# Patient Record
Sex: MALE | Race: WHITE | Employment: OTHER | URBAN - METROPOLITAN AREA
[De-identification: names, ages, dates, MRNs, and addresses within clinical notes are randomized per-mention and may not be internally consistent; named-entity substitution may affect disease eponyms.]

---

## 2017-04-04 ENCOUNTER — OFFICE VISIT (OUTPATIENT)
Dept: ENT CLINIC | Age: 77
End: 2017-04-04

## 2017-04-04 VITALS — TEMPERATURE: 98.1 F | HEIGHT: 72 IN | SYSTOLIC BLOOD PRESSURE: 132 MMHG | DIASTOLIC BLOOD PRESSURE: 66 MMHG

## 2017-04-04 DIAGNOSIS — H81.12 BPPV (BENIGN PAROXYSMAL POSITIONAL VERTIGO), LEFT: Primary | ICD-10-CM

## 2017-04-04 DIAGNOSIS — H81.319 VERTIGO, AURAL, UNSPECIFIED LATERALITY: ICD-10-CM

## 2017-04-04 DIAGNOSIS — H61.23 BILATERAL IMPACTED CERUMEN: ICD-10-CM

## 2017-04-04 PROCEDURE — 99203 OFFICE O/P NEW LOW 30 MIN: CPT | Performed by: OTOLARYNGOLOGY

## 2017-04-04 PROCEDURE — 69210 REMOVE IMPACTED EAR WAX UNI: CPT | Performed by: OTOLARYNGOLOGY

## 2017-10-26 ENCOUNTER — OFFICE VISIT (OUTPATIENT)
Dept: DERMATOLOGY | Age: 77
End: 2017-10-26

## 2017-10-26 DIAGNOSIS — C84.A0 CUTANEOUS T-CELL LYMPHOMA, UNSPECIFIED BODY REGION (HCC): Primary | ICD-10-CM

## 2017-10-26 DIAGNOSIS — L57.0 AK (ACTINIC KERATOSIS): ICD-10-CM

## 2017-10-26 PROCEDURE — 17000 DESTRUCT PREMALG LESION: CPT | Performed by: DERMATOLOGY

## 2017-10-26 PROCEDURE — G8427 DOCREV CUR MEDS BY ELIG CLIN: HCPCS | Performed by: DERMATOLOGY

## 2017-10-26 PROCEDURE — G8421 BMI NOT CALCULATED: HCPCS | Performed by: DERMATOLOGY

## 2017-10-26 PROCEDURE — 1123F ACP DISCUSS/DSCN MKR DOCD: CPT | Performed by: DERMATOLOGY

## 2017-10-26 PROCEDURE — 4040F PNEUMOC VAC/ADMIN/RCVD: CPT | Performed by: DERMATOLOGY

## 2017-10-26 PROCEDURE — 17003 DESTRUCT PREMALG LES 2-14: CPT | Performed by: DERMATOLOGY

## 2017-10-26 PROCEDURE — 1036F TOBACCO NON-USER: CPT | Performed by: DERMATOLOGY

## 2017-10-26 PROCEDURE — G8484 FLU IMMUNIZE NO ADMIN: HCPCS | Performed by: DERMATOLOGY

## 2017-10-26 PROCEDURE — 99213 OFFICE O/P EST LOW 20 MIN: CPT | Performed by: DERMATOLOGY

## 2017-10-26 RX ORDER — TRIAMCINOLONE ACETONIDE 1 MG/G
CREAM TOPICAL
Qty: 160 G | Refills: 3 | Status: SHIPPED | OUTPATIENT
Start: 2017-10-26 | End: 2019-10-29 | Stop reason: SDUPTHER

## 2017-10-26 NOTE — PROGRESS NOTES
[facility]  Aylin Vines, Liseth Schuler  1940    65 y.o. male     Date of Visit: 10/26/2017    Chief Complaint: f/u for AKs and CTCL    History of Present Illness:    1. 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 He denies any associated pruritus. Patches improve with use of triamcinolone 0.1% cream.    2.  Follow-up for history of actinic keratosescomplains of several new scaly lesions on the face today. Review of Systems:  Gen: No fevers chills or night sweats. Past Medical History, Family History, Surgical History, Medications and Allergies reviewed. Past Medical History:   Diagnosis Date    Diabetes mellitus type 2, uncomplicated ([address]     Epidermoid cyst of neck     left    H/O carotid artery stenosis 2010    left    Hyperlipidemia     Hypertension     [address]osis fungoides [address] [address]      Past Surgical History:   Procedure Laterality Date    CAROTID ENDARTERECTOMY Left 2010    Dr. Dee Dee Neff - w/Hemashield patch angioplasty    CORONARY ARTERY BYPASS GRAFT      CYST REMOVAL  2013    Dr. Jourdan Mendiola - epidermoid cyst L neck at base   [address] [facility]         No Known Allergies  Outpatient Prescriptions Marked as Taking for the 10/26/17 encounter (Office Visit) with Milagro Donato MD   Medication Sig Dispense Refill    triamcinolone (KENALOG) 0.1 % cream Apply to affected areas twice daily until improved. 160 g 3    atorvastatin (LIPITOR) 80 MG tablet Take 1 tablet by mouth nightly      metoprolol (TOPROL XL) 50 MG XL tablet   Take 50 mg by mouth daily       aspirin 325 MG tablet Take 325 mg by mouth daily.       chlorthalidone (HYGROTEN) 25 MG tablet   Take 25 mg by mouth daily       lisinopril (PRINIVIL;ZESTRIL) 20 MG tablet   Take 20 mg by mouth daily       metformin (GLUCOPHAGE) 1000 MG tablet   1,000 mg 2 times daily (with meals)       ezetimibe (ZETIA) 10 MG

## 2017-11-29 ENCOUNTER — HOSPITAL ENCOUNTER (OUTPATIENT)
Dept: VASCULAR LAB | Age: 77
Discharge: HOME | End: 2017-11-29
Attending: INTERNAL MEDICINE | Admitting: INTERNAL MEDICINE

## 2017-11-29 DIAGNOSIS — I65.23 OCCLUSION AND STENOSIS OF BILATERAL CAROTID ARTERIES: ICD-10-CM

## 2017-12-20 ENCOUNTER — OFFICE VISIT (OUTPATIENT)
Dept: INTERNAL MEDICINE | Age: 77
End: 2017-12-20

## 2017-12-20 ENCOUNTER — HOSPITAL ENCOUNTER (OUTPATIENT)
Dept: OTHER | Age: 77
Discharge: HOME | End: 2017-12-20
Attending: INTERNAL MEDICINE | Admitting: INTERNAL MEDICINE

## 2017-12-20 VITALS
HEIGHT: 72 IN | DIASTOLIC BLOOD PRESSURE: 78 MMHG | WEIGHT: 207 LBS | HEART RATE: 66 BPM | BODY MASS INDEX: 28.04 KG/M2 | RESPIRATION RATE: 12 BRPM | SYSTOLIC BLOOD PRESSURE: 136 MMHG

## 2017-12-20 DIAGNOSIS — M79.641 HAND PAIN, RIGHT: Primary | ICD-10-CM

## 2017-12-20 DIAGNOSIS — M79.641 HAND PAIN, RIGHT: ICD-10-CM

## 2017-12-20 PROCEDURE — G8598 ASA/ANTIPLAT THER USED: HCPCS | Performed by: INTERNAL MEDICINE

## 2017-12-20 PROCEDURE — 1123F ACP DISCUSS/DSCN MKR DOCD: CPT | Performed by: INTERNAL MEDICINE

## 2017-12-20 PROCEDURE — G8427 DOCREV CUR MEDS BY ELIG CLIN: HCPCS | Performed by: INTERNAL MEDICINE

## 2017-12-20 PROCEDURE — 1036F TOBACCO NON-USER: CPT | Performed by: INTERNAL MEDICINE

## 2017-12-20 PROCEDURE — 4040F PNEUMOC VAC/ADMIN/RCVD: CPT | Performed by: INTERNAL MEDICINE

## 2017-12-20 PROCEDURE — G8419 CALC BMI OUT NRM PARAM NOF/U: HCPCS | Performed by: INTERNAL MEDICINE

## 2017-12-20 PROCEDURE — 99213 OFFICE O/P EST LOW 20 MIN: CPT | Performed by: INTERNAL MEDICINE

## 2017-12-20 PROCEDURE — G8484 FLU IMMUNIZE NO ADMIN: HCPCS | Performed by: INTERNAL MEDICINE

## 2017-12-20 RX ORDER — METHYLPREDNISOLONE 4 MG/1
4 TABLET ORAL SEE ADMIN INSTRUCTIONS
Qty: 1 KIT | Refills: 0 | Status: SHIPPED | OUTPATIENT
Start: 2017-12-20 | End: 2017-12-26

## 2017-12-20 NOTE — PROGRESS NOTES
Chief Complaint   Patient presents with    Hand Pain     right        HPI:  A patient of Dr. Checo Pelayo. A one week history of pain in his right palm with swelling of the dorsum and knuckles. No history of trauma or injury. When he does certain movements he gets stabbing shooting pains in the second and third digits that lasts a second or two. Social History     Social History    Marital status: Single     Spouse name: N/A    Number of children: N/A    Years of education: N/A     Occupational History    Not on file. Social History Main Topics    Smoking status: Never Smoker    Smokeless tobacco: Never Used      Comment: Not needed    Alcohol use 1.8 oz/week     3 Standard drinks or equivalent per week      Comment: [place]    Drug use: No    Sexual activity: Not on file     Other Topics Concern    Not on file     Social History Narrative    No narrative on file     Patient Active Problem List   Diagnosis    H/O carotid artery stenosis     Past Medical History:   Diagnosis Date    Diabetes mellitus type 2, uncomplicated ([facility] [address]     Epidermoid cyst of neck 2013    left    H/O carotid artery stenosis 2010    left    Hyperlipidemia     Hypertension     Mycosis fungoides ([facility] [address]      Past Surgical History:   Procedure Laterality Date    CAROTID ENDARTERECTOMY Left 6/2/2010    Dr. Anna Marie Tong - w/Hemashield patch angioplasty    CORONARY ARTERY BYPASS GRAFT      CYST REMOVAL  5/31/2013    Dr. Lisette Santoyo - epidermoid cyst L neck at base    HERNIA REPAIR       Current Outpatient Prescriptions   Medication Sig Dispense Refill    triamcinolone (KENALOG) 0.1 % cream Apply to affected areas twice daily until improved. 160 g 3    atorvastatin (LIPITOR) 80 MG tablet Take 1 tablet by mouth nightly      metoprolol (TOPROL XL) 50 MG XL tablet   Take 50 mg by mouth daily       aspirin 325 MG tablet Take 325 mg by mouth daily.       chlorthalidone (HYGROTEN) 25 MG tablet   Take 25 mg by mouth daily       lisinopril (PRINIVIL;ZESTRIL) 20 MG tablet   Take 20 mg by mouth daily       metformin (GLUCOPHAGE) 1000 MG tablet   1,000 mg 2 times daily (with meals)       ezetimibe (ZETIA) 10 MG tablet Take 10 mg by mouth daily. No current facility-administered medications for this visit. No Known Allergies    Physical Exam:   /78 (Site: Left Arm, Position: Sitting, Cuff Size: Medium Adult)   Pulse 66   Resp 12   Ht 6' (1.829 m)   Wt 207 lb (93.9 kg)   BMI 28.07 kg/m²   General appearance: alert, appears stated age and cooperative  Lungs: clear to auscultation bilaterally  Heart: regular rate and rhythm, S1, S2 normal, no murmur, click, rub or gallop  Extremities: extremities normal, atraumatic, no cyanosis or edema  Other: Slight swelling of the MCP joints and dorsum. No heat or redness. Good arterial pulses. Lab Review: not applicable      Assessment: Right hand pain - etiology unclear    Plan:               Right hand films. Medrol dose pack     FREDERICK Kuhn MD

## 2018-10-25 ENCOUNTER — OFFICE VISIT (OUTPATIENT)
Dept: DERMATOLOGY | Age: 78
End: 2018-10-25
Payer: MEDICARE

## 2018-10-25 DIAGNOSIS — L82.1 SK (SEBORRHEIC KERATOSIS): ICD-10-CM

## 2018-10-25 DIAGNOSIS — C84.A0 CUTANEOUS T-CELL LYMPHOMA, UNSPECIFIED BODY REGION (HCC): Primary | ICD-10-CM

## 2018-10-25 DIAGNOSIS — L57.0 AK (ACTINIC KERATOSIS): ICD-10-CM

## 2018-10-25 PROCEDURE — 17003 DESTRUCT PREMALG LES 2-14: CPT | Performed by: DERMATOLOGY

## 2018-10-25 PROCEDURE — G8427 DOCREV CUR MEDS BY ELIG CLIN: HCPCS | Performed by: DERMATOLOGY

## 2018-10-25 PROCEDURE — G8419 CALC BMI OUT NRM PARAM NOF/U: HCPCS | Performed by: DERMATOLOGY

## 2018-10-25 PROCEDURE — G8484 FLU IMMUNIZE NO ADMIN: HCPCS | Performed by: DERMATOLOGY

## 2018-10-25 PROCEDURE — 1101F PT FALLS ASSESS-DOCD LE1/YR: CPT | Performed by: DERMATOLOGY

## 2018-10-25 PROCEDURE — 17000 DESTRUCT PREMALG LESION: CPT | Performed by: DERMATOLOGY

## 2018-10-25 PROCEDURE — 1123F ACP DISCUSS/DSCN MKR DOCD: CPT | Performed by: DERMATOLOGY

## 2018-10-25 PROCEDURE — 1036F TOBACCO NON-USER: CPT | Performed by: DERMATOLOGY

## 2018-10-25 PROCEDURE — 99213 OFFICE O/P EST LOW 20 MIN: CPT | Performed by: DERMATOLOGY

## 2018-10-25 PROCEDURE — 4040F PNEUMOC VAC/ADMIN/RCVD: CPT | Performed by: DERMATOLOGY

## 2019-01-03 ENCOUNTER — HOSPITAL ENCOUNTER (OUTPATIENT)
Dept: VASCULAR LAB | Age: 79
Discharge: HOME | End: 2019-01-03
Payer: MEDICARE

## 2019-01-03 PROCEDURE — 93880 EXTRACRANIAL BILAT STUDY: CPT

## 2019-10-29 ENCOUNTER — TELEPHONE (OUTPATIENT)
Dept: DERMATOLOGY | Age: 79
End: 2019-10-29

## 2019-10-29 ENCOUNTER — OFFICE VISIT (OUTPATIENT)
Dept: DERMATOLOGY | Age: 79
End: 2019-10-29
Payer: MEDICARE

## 2019-10-29 DIAGNOSIS — L82.1 SK (SEBORRHEIC KERATOSIS): ICD-10-CM

## 2019-10-29 DIAGNOSIS — B35.4 TINEA CORPORIS: ICD-10-CM

## 2019-10-29 DIAGNOSIS — L57.0 ACTINIC KERATOSIS: ICD-10-CM

## 2019-10-29 DIAGNOSIS — C84.A0 CUTANEOUS T-CELL LYMPHOMA, UNSPECIFIED BODY REGION (HCC): Primary | ICD-10-CM

## 2019-10-29 PROCEDURE — 1036F TOBACCO NON-USER: CPT | Performed by: DERMATOLOGY

## 2019-10-29 PROCEDURE — 99213 OFFICE O/P EST LOW 20 MIN: CPT | Performed by: DERMATOLOGY

## 2019-10-29 PROCEDURE — G8427 DOCREV CUR MEDS BY ELIG CLIN: HCPCS | Performed by: DERMATOLOGY

## 2019-10-29 PROCEDURE — G8421 BMI NOT CALCULATED: HCPCS | Performed by: DERMATOLOGY

## 2019-10-29 PROCEDURE — 1123F ACP DISCUSS/DSCN MKR DOCD: CPT | Performed by: DERMATOLOGY

## 2019-10-29 PROCEDURE — G8484 FLU IMMUNIZE NO ADMIN: HCPCS | Performed by: DERMATOLOGY

## 2019-10-29 PROCEDURE — 4040F PNEUMOC VAC/ADMIN/RCVD: CPT | Performed by: DERMATOLOGY

## 2019-10-29 RX ORDER — TRIAMCINOLONE ACETONIDE 1 MG/G
CREAM TOPICAL
Qty: 160 G | Refills: 3 | Status: SHIPPED | OUTPATIENT
Start: 2019-10-29 | End: 2021-10-28

## 2019-12-31 ENCOUNTER — HOSPITAL ENCOUNTER (OUTPATIENT)
Dept: VASCULAR LAB | Age: 79
Discharge: HOME | End: 2019-12-31
Payer: MEDICARE

## 2019-12-31 PROCEDURE — 93880 EXTRACRANIAL BILAT STUDY: CPT

## 2020-10-29 ENCOUNTER — OFFICE VISIT (OUTPATIENT)
Dept: DERMATOLOGY | Age: 80
End: 2020-10-29
Payer: MEDICARE

## 2020-10-29 VITALS — TEMPERATURE: 97.4 F

## 2020-10-29 PROCEDURE — 99213 OFFICE O/P EST LOW 20 MIN: CPT | Performed by: DERMATOLOGY

## 2020-10-29 PROCEDURE — 1036F TOBACCO NON-USER: CPT | Performed by: DERMATOLOGY

## 2020-10-29 PROCEDURE — 1123F ACP DISCUSS/DSCN MKR DOCD: CPT | Performed by: DERMATOLOGY

## 2020-10-29 PROCEDURE — 4040F PNEUMOC VAC/ADMIN/RCVD: CPT | Performed by: DERMATOLOGY

## 2020-10-29 PROCEDURE — G8484 FLU IMMUNIZE NO ADMIN: HCPCS | Performed by: DERMATOLOGY

## 2020-10-29 PROCEDURE — G8427 DOCREV CUR MEDS BY ELIG CLIN: HCPCS | Performed by: DERMATOLOGY

## 2020-10-29 PROCEDURE — G8421 BMI NOT CALCULATED: HCPCS | Performed by: DERMATOLOGY

## 2020-10-29 RX ORDER — CLOBETASOL PROPIONATE 0.5 MG/G
CREAM TOPICAL
Qty: 60 G | Refills: 2 | Status: SHIPPED | OUTPATIENT
Start: 2020-10-29 | End: 2021-10-28 | Stop reason: SDUPTHER

## 2021-06-29 ENCOUNTER — HOSPITAL ENCOUNTER (OUTPATIENT)
Dept: VASCULAR LAB | Age: 81
Discharge: HOME | End: 2021-06-29
Payer: MEDICARE

## 2021-06-29 DIAGNOSIS — I65.21 OCCLUSION OF RIGHT CAROTID ARTERY: ICD-10-CM

## 2021-06-29 PROCEDURE — 93880 EXTRACRANIAL BILAT STUDY: CPT

## 2021-10-28 ENCOUNTER — OFFICE VISIT (OUTPATIENT)
Dept: DERMATOLOGY | Age: 81
End: 2021-10-28
Payer: MEDICARE

## 2021-10-28 ENCOUNTER — OFFICE VISIT (OUTPATIENT)
Dept: ENT CLINIC | Age: 81
End: 2021-10-28
Payer: MEDICARE

## 2021-10-28 VITALS
BODY MASS INDEX: 27.6 KG/M2 | SYSTOLIC BLOOD PRESSURE: 133 MMHG | DIASTOLIC BLOOD PRESSURE: 55 MMHG | HEART RATE: 74 BPM | TEMPERATURE: 97.5 F | WEIGHT: 203.8 LBS | HEIGHT: 72 IN

## 2021-10-28 VITALS — TEMPERATURE: 97.9 F

## 2021-10-28 DIAGNOSIS — B07.8 OTHER VIRAL WARTS: ICD-10-CM

## 2021-10-28 DIAGNOSIS — H61.23 BILATERAL IMPACTED CERUMEN: Primary | ICD-10-CM

## 2021-10-28 DIAGNOSIS — L57.0 ACTINIC KERATOSIS: ICD-10-CM

## 2021-10-28 DIAGNOSIS — C84.A0 CUTANEOUS T-CELL LYMPHOMA, UNSPECIFIED BODY REGION (HCC): Primary | ICD-10-CM

## 2021-10-28 DIAGNOSIS — H93.8X3 SENSATION OF FULLNESS IN BOTH EARS: ICD-10-CM

## 2021-10-28 PROCEDURE — 4040F PNEUMOC VAC/ADMIN/RCVD: CPT | Performed by: DERMATOLOGY

## 2021-10-28 PROCEDURE — 1036F TOBACCO NON-USER: CPT | Performed by: DERMATOLOGY

## 2021-10-28 PROCEDURE — 69210 REMOVE IMPACTED EAR WAX UNI: CPT | Performed by: OTOLARYNGOLOGY

## 2021-10-28 PROCEDURE — G8417 CALC BMI ABV UP PARAM F/U: HCPCS | Performed by: OTOLARYNGOLOGY

## 2021-10-28 PROCEDURE — G8427 DOCREV CUR MEDS BY ELIG CLIN: HCPCS | Performed by: OTOLARYNGOLOGY

## 2021-10-28 PROCEDURE — 4040F PNEUMOC VAC/ADMIN/RCVD: CPT | Performed by: OTOLARYNGOLOGY

## 2021-10-28 PROCEDURE — G8484 FLU IMMUNIZE NO ADMIN: HCPCS | Performed by: DERMATOLOGY

## 2021-10-28 PROCEDURE — G8427 DOCREV CUR MEDS BY ELIG CLIN: HCPCS | Performed by: DERMATOLOGY

## 2021-10-28 PROCEDURE — 1123F ACP DISCUSS/DSCN MKR DOCD: CPT | Performed by: DERMATOLOGY

## 2021-10-28 PROCEDURE — 99213 OFFICE O/P EST LOW 20 MIN: CPT | Performed by: DERMATOLOGY

## 2021-10-28 PROCEDURE — 1123F ACP DISCUSS/DSCN MKR DOCD: CPT | Performed by: OTOLARYNGOLOGY

## 2021-10-28 PROCEDURE — 1036F TOBACCO NON-USER: CPT | Performed by: OTOLARYNGOLOGY

## 2021-10-28 PROCEDURE — G8484 FLU IMMUNIZE NO ADMIN: HCPCS | Performed by: OTOLARYNGOLOGY

## 2021-10-28 PROCEDURE — G8421 BMI NOT CALCULATED: HCPCS | Performed by: DERMATOLOGY

## 2021-10-28 PROCEDURE — 99203 OFFICE O/P NEW LOW 30 MIN: CPT | Performed by: OTOLARYNGOLOGY

## 2021-10-28 RX ORDER — CLOBETASOL PROPIONATE 0.5 MG/G
CREAM TOPICAL
Qty: 60 G | Refills: 2 | Status: SHIPPED | OUTPATIENT
Start: 2021-10-28

## 2021-10-28 NOTE — PROGRESS NOTES
[facility]  Liseth Montalvo  1940    79 y.o. male     Date of Visit: 10/28/2021    Chief Complaint: CTCL    History of Present Illness:    1. 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 Improved with clobetasol cream and heliotherapy over the summer months. It is not bothersome currently. 2.  He has few asymptomatic scaly lesions on the sides of his face. 3.  He complains of a newly noted growth on the right side of the chin. Review of Systems:  Gen: Feels well, good sense of health. Past Medical History, Family History, Surgical History, Medications and Allergies reviewed. Past Medical History:   Diagnosis Date    Diabetes mellitus type 2, uncomplicated ([address]     Epidermoid cyst of neck 2013    left    H/O carotid artery stenosis 2010    left    Hyperlipidemia     Hypertension     Mycosis fungoides ([facility] [address]      Past Surgical History:   Procedure Laterality Date    CAROTID ENDARTERECTOMY Left 6/2/2010    Dr. Ambreen Hadley - w/Hemashield patch angioplasty    CORONARY ARTERY BYPASS GRAFT      CYST REMOVAL  5/31/2013    Dr. Lashonda Thompson - epidermoid cyst L neck at base   [address] [facility]         No Known Allergies  Outpatient Medications Marked as Taking for the 10/28/21 encounter (Office Visit) with Chaes Henriquez MD   Medication Sig Dispense Refill    clobetasol (TEMOVATE) 0.05 % cream Apply to affected areas twice daily until improved. 60 g 2    atorvastatin (LIPITOR) 80 MG tablet Take 1 tablet by mouth nightly      metoprolol (TOPROL XL) 50 MG XL tablet   Take 50 mg by mouth daily       aspirin 325 MG tablet Take 325 mg by mouth daily.       chlorthalidone (HYGROTEN) 25 MG tablet   Take 25 mg by mouth daily       lisinopril (PRINIVIL;ZESTRIL) 20 MG tablet   Take 20 mg by mouth daily       metformin (GLUCOPHAGE) 1000 MG tablet   1,000 mg 2 times daily (with meals)       ezetimibe

## 2021-10-28 NOTE — PROGRESS NOTES
angioplasty    CORONARY ARTERY BYPASS GRAFT      CYST REMOVAL  5/31/2013    Dr. Joan Zaldivar - epidermoid cyst L neck at base    HERNIA REPAIR       FAMILY HISTORY: Family history reviewed. Except as noted in history of present illness, there is no pertinent family history      REVIEW OF SYSTEMS:  All pertinent positive and negative review of systems included in HPI. Otherwise, all systems are reviewed and negative. PHYSICAL EXAMINATION:   GENERAL: wdwn- no acute distress  RESPIRATORY:  No stridor or respiratory distress  COMMUNICATION :  Normal voice  MENTAL STATUS:  Mood and affect normal, oriented X 3  HEAD AND FACE:  No abnormalities of the skin of face or head  EXTERNAL EARS AND NOSE:  Normal pinnae bilateral  FACIAL MUSCLES:  All branches of facial nerve intact  EXTRAOCULAR MUSCLES: Intact with full range of motion  FACE PALPATION:  No tenderness over sinuses. Zygomatic arches and orbital rims intact  OTOSCOPY: Cerumen occludes both external ears. TUNING FORKS: Rinne ++ Gaines midline at 512 Hz  INTRANASAL:  Septum midline, turbinates normal, meati clear. LIPS, TEETH, GINGIVA:  Normal mucosa  PHARYNX:  Normal  NECK:  No masses. LYMPHATIC:  No cervical adenopathy  SALIVARY GLANDS:  No swelling or masses in the parotid or submandibular salivary glands  THYROID:  No goiter or thyroid masses. IMPRESSION: Fullness in both ears due to impacted cerumen. PLAN: Cerumen removed from both ears using curettes. The tympanic membranes and middle ear spaces are normal.    FOLLOW-UP: As needed.

## 2022-09-13 ENCOUNTER — HOSPITAL ENCOUNTER (OUTPATIENT)
Dept: VASCULAR LAB | Age: 82
Discharge: HOME | End: 2022-09-13
Payer: MEDICARE

## 2022-09-13 DIAGNOSIS — I65.21 OCCLUSION OF RIGHT CAROTID ARTERY: ICD-10-CM

## 2022-09-13 PROCEDURE — 93880 EXTRACRANIAL BILAT STUDY: CPT

## 2022-10-31 ENCOUNTER — OFFICE VISIT (OUTPATIENT)
Dept: DERMATOLOGY | Age: 82
End: 2022-10-31
Payer: MEDICARE

## 2022-10-31 DIAGNOSIS — L57.0 ACTINIC KERATOSIS: ICD-10-CM

## 2022-10-31 DIAGNOSIS — C84.A0 CUTANEOUS T-CELL LYMPHOMA, UNSPECIFIED BODY REGION (HCC): Primary | ICD-10-CM

## 2022-10-31 PROCEDURE — 1036F TOBACCO NON-USER: CPT | Performed by: DERMATOLOGY

## 2022-10-31 PROCEDURE — 17000 DESTRUCT PREMALG LESION: CPT | Performed by: DERMATOLOGY

## 2022-10-31 PROCEDURE — G8427 DOCREV CUR MEDS BY ELIG CLIN: HCPCS | Performed by: DERMATOLOGY

## 2022-10-31 PROCEDURE — G8484 FLU IMMUNIZE NO ADMIN: HCPCS | Performed by: DERMATOLOGY

## 2022-10-31 PROCEDURE — 1123F ACP DISCUSS/DSCN MKR DOCD: CPT | Performed by: DERMATOLOGY

## 2022-10-31 PROCEDURE — G8421 BMI NOT CALCULATED: HCPCS | Performed by: DERMATOLOGY

## 2022-10-31 PROCEDURE — 99213 OFFICE O/P EST LOW 20 MIN: CPT | Performed by: DERMATOLOGY

## 2022-10-31 RX ORDER — GLIMEPIRIDE 4 MG/1
4 TABLET ORAL
COMMUNITY

## 2022-10-31 NOTE — PROGRESS NOTES
[facility]  Angelia Jalen Vaildinora Allred  1940    79 y.o. male     Date of Visit: 10/31/2022    Chief Complaint: skin lesions    History of Present Illness:    1. He returns today to follow-up for stage IA cutaneous T-cell lymphoma affecting the trunk and extremities. Reports doing well with intermittent use of clobetasol cream.  Fades in the summer months with sun exposure. 2.  He has 1 persistent scaly lesion on the helix of the right ear. Review of Systems:  Gen: Feels well, good sense of health. Past Medical History, Family History, Surgical History, Medications and Allergies reviewed. Past Medical History:   Diagnosis Date    Diabetes mellitus type 2, uncomplicated ([facility] [address]     Epidermoid cyst of neck 2013    left    GERD (gastroesophageal reflux disease)     H/O carotid artery stenosis 2010    left    Hyperlipidemia     Hypertension     Mycosis fungoides ([facility] [address]      Past Surgical History:   Procedure Laterality Date    CAROTID ENDARTERECTOMY Left 6/2/2010    Dr. Sujata Dallas - w/Hemashield patch angioplasty    CORONARY ARTERY BYPASS GRAFT      CYST REMOVAL  5/31/2013    Dr. Mynor Huang - epidermoid cyst L neck at base    HERNIA REPAIR         No Known Allergies  Outpatient Medications Marked as Taking for the 10/31/22 encounter (Office Visit) with Yadira Bekcman MD   Medication Sig Dispense Refill    glimepiride (AMARYL) 4 MG tablet Take 4 mg by mouth every morning (before breakfast)      clobetasol (TEMOVATE) 0.05 % cream Apply to affected areas twice daily until improved. 60 g 2    atorvastatin (LIPITOR) 80 MG tablet Take 1 tablet by mouth nightly      metoprolol succinate (TOPROL XL) 50 MG extended release tablet   Take 50 mg by mouth daily       aspirin 325 MG tablet Take 325 mg by mouth daily.       chlorthalidone (HYGROTEN) 25 MG tablet   Take 25 mg by mouth daily       lisinopril (PRINIVIL;ZESTRIL) 20 MG tablet   Take 20 mg by mouth daily metformin (GLUCOPHAGE) 1000 MG tablet   1,000 mg 2 times daily (with meals)       ezetimibe (ZETIA) 10 MG tablet Take 10 mg by mouth daily. Physical Examination       The following were examined and determined to be normal: Psych/Neuro, Scalp/hair, Conjunctivae/eyelids, Gums/teeth/lips, Neck, Abdomen, Back, and Nails/digits. The following were examined and determined to be abnormal: Head/face, Breast/axilla/chest, RUE, LUE, RLE, and LLE. Well appearing. 1.  Chest and proximal extremities - several ill defined scaly pink patches. 2.  Right mid helix - 1 keratotic pink macule. Assessment and Plan     1. Cutaneous T-cell lymphoma, stage IA, less than 4% BSA - minimal disease activity    Continue application of clobetasol cream twice daily as needed. Heliotherapy in the summer months. 2. Actinic keratosis - 1    Cryotherapy was discussed and patient agreed to proceed. Consent was obtained. 1 lesions were treated cryotherapy: right mid helix. 2 cycles of liquid nitrogen applied to each lesion for 5 seconds using a Cry-Ac cryo spray gun. Patient was educated regarding the potential risks of blister formation and discomfort. Wound care was discussed. The patient tolerated the procedure well and there were no immediate complications. Return in about 1 year (around 10/31/2023).     --Duane Quest, MD

## 2023-04-19 ENCOUNTER — HOSPITAL ENCOUNTER (OUTPATIENT)
Dept: VASCULAR LAB | Age: 83
Discharge: HOME | End: 2023-04-19
Payer: MEDICARE

## 2023-04-19 DIAGNOSIS — I65.21 CAROTID STENOSIS, RIGHT: ICD-10-CM

## 2023-04-19 PROCEDURE — 93880 EXTRACRANIAL BILAT STUDY: CPT

## 2023-08-25 DIAGNOSIS — N18.30 STAGE 3 CHRONIC KIDNEY DISEASE, UNSPECIFIED WHETHER STAGE 3A OR 3B CKD (HCC): ICD-10-CM

## 2023-08-25 LAB
ALBUMIN SERPL-MCNC: 4.2 G/DL (ref 3.4–5)
ANION GAP SERPL CALCULATED.3IONS-SCNC: 13 MMOL/L (ref 3–16)
BASOPHILS # BLD: 0 K/UL (ref 0–0.2)
BASOPHILS NFR BLD: 0.5 %
BUN SERPL-MCNC: 32 MG/DL (ref 7–20)
CALCIUM SERPL-MCNC: 9 MG/DL (ref 8.3–10.6)
CHLORIDE SERPL-SCNC: 103 MMOL/L (ref 99–110)
CO2 SERPL-SCNC: 24 MMOL/L (ref 21–32)
CREAT SERPL-MCNC: 2.1 MG/DL (ref 0.8–1.3)
CREAT UR-MCNC: 95.3 MG/DL (ref 39–259)
DEPRECATED RDW RBC AUTO: 14.2 % (ref 12.4–15.4)
EOSINOPHIL # BLD: 0.2 K/UL (ref 0–0.6)
EOSINOPHIL NFR BLD: 3 %
GFR SERPLBLD CREATININE-BSD FMLA CKD-EPI: 30 ML/MIN/{1.73_M2}
GLUCOSE SERPL-MCNC: 66 MG/DL (ref 70–99)
HCT VFR BLD AUTO: 34.1 % (ref 40.5–52.5)
HGB BLD-MCNC: 11.6 G/DL (ref 13.5–17.5)
LYMPHOCYTES # BLD: 2.2 K/UL (ref 1–5.1)
LYMPHOCYTES NFR BLD: 28.7 %
MCH RBC QN AUTO: 30.7 PG (ref 26–34)
MCHC RBC AUTO-ENTMCNC: 34.2 G/DL (ref 31–36)
MCV RBC AUTO: 89.9 FL (ref 80–100)
MICROALBUMIN UR DL<=1MG/L-MCNC: 44.8 MG/DL
MICROALBUMIN/CREAT UR: 470.1 MG/G (ref 0–30)
MONOCYTES # BLD: 0.8 K/UL (ref 0–1.3)
MONOCYTES NFR BLD: 9.8 %
NEUTROPHILS # BLD: 4.5 K/UL (ref 1.7–7.7)
NEUTROPHILS NFR BLD: 58 %
PHOSPHATE SERPL-MCNC: 4.2 MG/DL (ref 2.5–4.9)
PLATELET # BLD AUTO: 311 K/UL (ref 135–450)
PMV BLD AUTO: 8.3 FL (ref 5–10.5)
POTASSIUM SERPL-SCNC: 4.8 MMOL/L (ref 3.5–5.1)
RBC # BLD AUTO: 3.79 M/UL (ref 4.2–5.9)
SODIUM SERPL-SCNC: 140 MMOL/L (ref 136–145)
WBC # BLD AUTO: 7.7 K/UL (ref 4–11)

## 2023-08-26 LAB
KAPPA LC FREE SER-MCNC: 84.57 MG/L (ref 3.3–19.4)
KAPPA LC FREE/LAMBDA FREE SER: 3.41 {RATIO} (ref 0.26–1.65)
LAMBDA LC FREE SERPL-MCNC: 24.79 MG/L (ref 5.71–26.3)
RPT COMMENT: ABNORMAL

## 2023-10-31 ENCOUNTER — OFFICE VISIT (OUTPATIENT)
Dept: DERMATOLOGY | Age: 83
End: 2023-10-31
Payer: MEDICARE

## 2023-10-31 DIAGNOSIS — L57.0 ACTINIC KERATOSIS: ICD-10-CM

## 2023-10-31 DIAGNOSIS — L82.0 INFLAMED SEBORRHEIC KERATOSIS: ICD-10-CM

## 2023-10-31 DIAGNOSIS — C84.A0 CUTANEOUS T-CELL LYMPHOMA, UNSPECIFIED BODY REGION (HCC): Primary | ICD-10-CM

## 2023-10-31 PROCEDURE — G8417 CALC BMI ABV UP PARAM F/U: HCPCS | Performed by: DERMATOLOGY

## 2023-10-31 PROCEDURE — 17110 DESTRUCTION B9 LES UP TO 14: CPT | Performed by: DERMATOLOGY

## 2023-10-31 PROCEDURE — 1123F ACP DISCUSS/DSCN MKR DOCD: CPT | Performed by: DERMATOLOGY

## 2023-10-31 PROCEDURE — G8484 FLU IMMUNIZE NO ADMIN: HCPCS | Performed by: DERMATOLOGY

## 2023-10-31 PROCEDURE — 1036F TOBACCO NON-USER: CPT | Performed by: DERMATOLOGY

## 2023-10-31 PROCEDURE — G8427 DOCREV CUR MEDS BY ELIG CLIN: HCPCS | Performed by: DERMATOLOGY

## 2023-10-31 PROCEDURE — 99213 OFFICE O/P EST LOW 20 MIN: CPT | Performed by: DERMATOLOGY

## 2023-10-31 PROCEDURE — 17000 DESTRUCT PREMALG LESION: CPT | Performed by: DERMATOLOGY

## 2023-10-31 NOTE — PROGRESS NOTES
[facility]  [facility]      [facility]  1940    79 y.o. male     Date of Visit: 10/31/2023    Chief Complaint: CTCL, skin lesions    History of Present Illness:    1. He returns today for stage Ia cutaneous T-cell lymphoma affecting the trunk and extremities-he remains mild and asymptomatic. He uses clobetasol cream at times with improvement. 2.  He reports a persistent scaly lesion on the right cheek. 3.  He also reports 2 irritated lesions on the left knee and right lateral brow. Review of Systems:  Gen: Feels well, good sense of health. Past Medical History, Family History, Surgical History, Medications and Allergies reviewed. Past Medical History:   Diagnosis Date    Diabetes mellitus type 2, uncomplicated ([address])     Epidermoid cyst of neck 2013    left    GERD (gastroesophageal reflux disease)     H/O carotid artery stenosis 2010    left    Hyperlipidemia     Hypertension     Mycosis fungoides (HCC)      Past Surgical History:   Procedure Laterality Date    CAROTID ENDARTERECTOMY Left 6/2/2010    Dr. Azael Teresa - w/Hemashield patch angioplasty    CORONARY ARTERY BYPASS GRAFT      CYST REMOVAL  5/31/2013    Dr. Lili Chowdary - epidermoid cyst L neck at [facility]         No Known Allergies  Outpatient Medications Marked as Taking for the 10/31/23 encounter (Office Visit) with Carolyn Solis MD   Medication Sig Dispense Refill    dapagliflozin (FARXIGA) 10 MG tablet Take 1 tablet by mouth every morning      pantoprazole (PROTONIX) 40 MG tablet Take 1 tablet by mouth daily      hydrALAZINE (APRESOLINE) 25 MG tablet       lisinopril (PRINIVIL;ZESTRIL) 40 MG tablet Take 1 tablet by mouth daily 90 tablet 5    glimepiride (AMARYL) 4 MG tablet Take 1 tablet by mouth every morning (before breakfast)      clobetasol (TEMOVATE) 0.05 % cream Apply to affected areas twice daily until improved.  60 g 2    atorvastatin (LIPITOR) 80 MG tablet Take 1

## 2023-12-21 DIAGNOSIS — N18.30 STAGE 3 CHRONIC KIDNEY DISEASE, UNSPECIFIED WHETHER STAGE 3A OR 3B CKD (HCC): ICD-10-CM

## 2023-12-21 LAB
ALBUMIN SERPL-MCNC: 4.2 G/DL (ref 3.4–5)
ANION GAP SERPL CALCULATED.3IONS-SCNC: 13 MMOL/L (ref 3–16)
BUN SERPL-MCNC: 37 MG/DL (ref 7–20)
CALCIUM SERPL-MCNC: 8.9 MG/DL (ref 8.3–10.6)
CHLORIDE SERPL-SCNC: 102 MMOL/L (ref 99–110)
CO2 SERPL-SCNC: 20 MMOL/L (ref 21–32)
CREAT SERPL-MCNC: 2 MG/DL (ref 0.8–1.3)
CREAT UR-MCNC: 59.7 MG/DL (ref 39–259)
GFR SERPLBLD CREATININE-BSD FMLA CKD-EPI: 32 ML/MIN/{1.73_M2}
GLUCOSE SERPL-MCNC: 270 MG/DL (ref 70–99)
MICROALBUMIN UR DL<=1MG/L-MCNC: 18.4 MG/DL
MICROALBUMIN/CREAT UR: 308.2 MG/G (ref 0–30)
PHOSPHATE SERPL-MCNC: 4.3 MG/DL (ref 2.5–4.9)
POTASSIUM SERPL-SCNC: 4.9 MMOL/L (ref 3.5–5.1)
SODIUM SERPL-SCNC: 135 MMOL/L (ref 136–145)

## 2024-02-05 ENCOUNTER — NURSE ONLY (OUTPATIENT)
Dept: CARDIOLOGY CLINIC | Age: 84
End: 2024-02-05

## 2024-02-05 DIAGNOSIS — Z86.79 H/O CAROTID ARTERY STENOSIS: Primary | ICD-10-CM

## 2024-02-05 PROCEDURE — 93242 EXT ECG>48HR<7D RECORDING: CPT | Performed by: INTERNAL MEDICINE

## 2024-02-16 PROCEDURE — 93244 EXT ECG>48HR<7D REV&INTERPJ: CPT | Performed by: INTERNAL MEDICINE

## 2024-02-16 NOTE — PROGRESS NOTES
[facility]     Outpatient Cardiology         Patient Name:  Jonathon Webb  Requesting Physician: No admitting provider for patient encounter.  Primary Care Physician: Heath Jc MD    Reason for Consultation/Chief Complaint:   Chief Complaint   Patient presents with    New Patient    Dizziness    Loss of Consciousness       HPI:     Jonathon Webb is a 83 y.o. male with a history of DMII, CAD CABG x4 2000, GERD, HLD, HTN and carotid artery disease. He was referred by Heath Jc MD for evaluation of syncope.     Today he reports that on 2/5/24, he woke from sleep at 5 am to walk to the bathroom. He sat in the side of the bed for 30 seconds, had no symptoms, does not recall what happened, then waking up on the floor. He is unaware how long he was down for and admits that his only injury was to his fingernails.  He followed up with his PCP a few days later and had a monitor placed that showed no events correlating to syncope. Patient currently denies any weight gain, edema, palpitations, chest pain, shortness of breath, dizziness, and syncope.     3 day ZIO 2/5/24: NSR, lowset HR 44, no arrhythmia, <1% PAC/PVC, no symptoms.     Histories:     Past Medical History:   has a past medical history of Diabetes mellitus type 2, uncomplicated (HCC), Epidermoid cyst of neck, GERD (gastroesophageal reflux disease), H/O carotid artery stenosis, Hyperlipidemia, Hypertension, and Mycosis fungoides (HCC).    Surgical History:   has a past surgical history that includes Coronary artery bypass graft; Carotid endarterectomy (Left, 6/2/2010); hernia repair; and cyst removal (5/31/2013).     Social History:   reports that he has never smoked. He has never used smokeless tobacco. He reports current alcohol use of about 3.0 standard drinks of alcohol per week. He reports that he does not use drugs.     Family History:  No evidence for sudden cardiac death or premature CAD    Medications:     Home

## 2024-02-19 ENCOUNTER — TELEPHONE (OUTPATIENT)
Dept: CARDIOLOGY CLINIC | Age: 84
End: 2024-02-19

## 2024-02-19 ENCOUNTER — OFFICE VISIT (OUTPATIENT)
Dept: CARDIOLOGY CLINIC | Age: 84
End: 2024-02-19
Payer: MEDICARE

## 2024-02-19 VITALS
SYSTOLIC BLOOD PRESSURE: 146 MMHG | BODY MASS INDEX: 26.94 KG/M2 | HEART RATE: 72 BPM | WEIGHT: 198.6 LBS | DIASTOLIC BLOOD PRESSURE: 82 MMHG

## 2024-02-19 DIAGNOSIS — I25.10 ATHEROSCLEROSIS OF NATIVE CORONARY ARTERY OF NATIVE HEART WITHOUT ANGINA PECTORIS: ICD-10-CM

## 2024-02-19 DIAGNOSIS — I10 HYPERTENSION, UNSPECIFIED TYPE: ICD-10-CM

## 2024-02-19 DIAGNOSIS — E78.2 MIXED HYPERLIPIDEMIA: ICD-10-CM

## 2024-02-19 DIAGNOSIS — Z95.1 S/P CABG X 4: ICD-10-CM

## 2024-02-19 DIAGNOSIS — R55 SYNCOPE AND COLLAPSE: Primary | ICD-10-CM

## 2024-02-19 DIAGNOSIS — R55 SYNCOPE, UNSPECIFIED SYNCOPE TYPE: Primary | ICD-10-CM

## 2024-02-19 PROCEDURE — G8427 DOCREV CUR MEDS BY ELIG CLIN: HCPCS | Performed by: INTERNAL MEDICINE

## 2024-02-19 PROCEDURE — 3075F SYST BP GE 130 - 139MM HG: CPT | Performed by: INTERNAL MEDICINE

## 2024-02-19 PROCEDURE — 3078F DIAST BP <80 MM HG: CPT | Performed by: INTERNAL MEDICINE

## 2024-02-19 PROCEDURE — 1036F TOBACCO NON-USER: CPT | Performed by: INTERNAL MEDICINE

## 2024-02-19 PROCEDURE — G8417 CALC BMI ABV UP PARAM F/U: HCPCS | Performed by: INTERNAL MEDICINE

## 2024-02-19 PROCEDURE — 99204 OFFICE O/P NEW MOD 45 MIN: CPT | Performed by: INTERNAL MEDICINE

## 2024-02-19 PROCEDURE — 93000 ELECTROCARDIOGRAM COMPLETE: CPT | Performed by: INTERNAL MEDICINE

## 2024-02-19 PROCEDURE — 1123F ACP DISCUSS/DSCN MKR DOCD: CPT | Performed by: INTERNAL MEDICINE

## 2024-02-19 PROCEDURE — G8484 FLU IMMUNIZE NO ADMIN: HCPCS | Performed by: INTERNAL MEDICINE

## 2024-02-19 NOTE — TELEPHONE ENCOUNTER
The final report for the [employer] monitor has been read and scanned under the media tab. Thank you for your referral. Please feel free to contact our office with any questions at  [phone].

## 2024-02-19 NOTE — PATIENT INSTRUCTIONS
Labs today  30 day cardiac event monitor  Echocardiogram  Wear compression stockings  Stay well hydrated  Follow up with me in 5-6 weeks   
0 = understands/communicates without difficulty

## 2024-02-27 ENCOUNTER — TELEPHONE (OUTPATIENT)
Dept: CARDIOLOGY CLINIC | Age: 84
End: 2024-02-27

## 2024-02-27 NOTE — TELEPHONE ENCOUNTER
Pt is wearing a CAM. Unable to pull report until returned. Spoke with RN, will send report from ZIO that was previously worn.

## 2024-02-27 NOTE — TELEPHONE ENCOUNTER
(nurse from [facility] in [facility] called and stated the patient is being admitted into the hospital.  Dr. Lexx Choi is the Cardiologist working with the patient.  He is requesting the results from the Holter monitor he currently has on now.  He also requested a copy of the recent EKG which is being sent to them.  A call back is requested for more information if possible on the patient from the doctor or nurse.  Call back [phone]

## 2024-08-29 DIAGNOSIS — E78.2 MIXED HYPERLIPIDEMIA: ICD-10-CM

## 2024-08-29 DIAGNOSIS — R55 SYNCOPE, UNSPECIFIED SYNCOPE TYPE: ICD-10-CM

## 2024-08-29 DIAGNOSIS — I10 HYPERTENSION, UNSPECIFIED TYPE: ICD-10-CM

## 2024-08-29 LAB
ALBUMIN SERPL-MCNC: 4.3 G/DL (ref 3.4–5)
ALBUMIN/GLOB SERPL: 1.7 {RATIO} (ref 1.1–2.2)
ALP SERPL-CCNC: 59 U/L (ref 40–129)
ALT SERPL-CCNC: 12 U/L (ref 10–40)
ANION GAP SERPL CALCULATED.3IONS-SCNC: 17 MMOL/L (ref 3–16)
AST SERPL-CCNC: 14 U/L (ref 15–37)
BILIRUB SERPL-MCNC: 0.4 MG/DL (ref 0–1)
BUN SERPL-MCNC: 32 MG/DL (ref 7–20)
CALCIUM SERPL-MCNC: 9.2 MG/DL (ref 8.3–10.6)
CHLORIDE SERPL-SCNC: 99 MMOL/L (ref 99–110)
CO2 SERPL-SCNC: 23 MMOL/L (ref 21–32)
CREAT SERPL-MCNC: 1.9 MG/DL (ref 0.8–1.3)
GFR SERPLBLD CREATININE-BSD FMLA CKD-EPI: 34 ML/MIN/{1.73_M2}
GLUCOSE SERPL-MCNC: 192 MG/DL (ref 70–99)
POTASSIUM SERPL-SCNC: 5 MMOL/L (ref 3.5–5.1)
PROT SERPL-MCNC: 6.8 G/DL (ref 6.4–8.2)
SODIUM SERPL-SCNC: 139 MMOL/L (ref 136–145)

## 2024-10-31 ENCOUNTER — OFFICE VISIT (OUTPATIENT)
Dept: DERMATOLOGY | Age: 84
End: 2024-10-31

## 2024-10-31 DIAGNOSIS — D48.5 NEOPLASM OF UNCERTAIN BEHAVIOR OF SKIN: ICD-10-CM

## 2024-10-31 DIAGNOSIS — L57.0 ACTINIC KERATOSIS: ICD-10-CM

## 2024-10-31 DIAGNOSIS — C84.00 MYCOSIS FUNGOIDES, UNSPECIFIED BODY REGION (HCC): Primary | ICD-10-CM

## 2024-10-31 RX ORDER — METOPROLOL SUCCINATE 50 MG/1
50 TABLET, EXTENDED RELEASE ORAL DAILY
COMMUNITY

## 2024-10-31 NOTE — PROGRESS NOTES
[facility]  Artie James MD  [phone]      Jonathon Webb  1940    84 y.o. male     Date of Visit: 10/31/2024    Chief Complaint: Mycosis fungoides, skin lesions    History of Present Illness:    1.  Follow-up for history of limited extent stage Ia mycosis fungoides affecting the trunk and lower extremities.  He reports excellent control with intermittent use of clobetasol cream.  He has few asymptomatic patches on the chest and thighs today.    2.  He has a couple of persistent scaly lesions on the upper forehead.    3.  He also reports a chronic nonhealing lesion on the left medial lower knee.    Has an ICD now.    Has stage 3 CKD.       Review of Systems:  Gen: Feels well, good sense of health.    Past Medical History, Family History, Surgical History, Medications and Allergies reviewed.    Past Medical History:   Diagnosis Date    Diabetes mellitus type 2, uncomplicated (HCC)     Epidermoid cyst of neck 2013    left    GERD (gastroesophageal reflux disease)     H/O carotid artery stenosis 2010    left    Hyperlipidemia     Hypertension     Mycosis fungoides (HCC)      Past Surgical History:   Procedure Laterality Date    CAROTID ENDARTERECTOMY Left 6/2/2010    Dr. Resendez - w/Hemashield patch angioplasty    CORONARY ARTERY BYPASS GRAFT      CYST REMOVAL  5/31/2013    Dr. Garza - epidermoid cyst L neck at base    HERNIA REPAIR         No Known Allergies  Outpatient Medications Marked as Taking for the 10/31/24 encounter (Office Visit) with Artie James MD   Medication Sig Dispense Refill    metoprolol succinate (TOPROL XL) 50 MG extended release tablet Take 1 tablet by mouth daily      ASPIRIN 81 PO Take by mouth      clopidogrel (PLAVIX) 75 MG tablet Take 1 tablet by mouth daily      dapagliflozin (FARXIGA) 10 MG tablet Take 1 tablet by mouth every morning      hydrALAZINE (APRESOLINE) 25 MG tablet Take 1 tablet by mouth 3 times daily      clobetasol (TEMOVATE) 0.05 %

## 2024-10-31 NOTE — PATIENT INSTRUCTIONS

## 2024-11-05 LAB — DERMATOLOGY PATHOLOGY REPORT: ABNORMAL

## 2024-11-08 ENCOUNTER — TELEPHONE (OUTPATIENT)
Dept: DERMATOLOGY | Age: 84
End: 2024-11-08

## 2024-12-06 ENCOUNTER — PROCEDURE VISIT (OUTPATIENT)
Dept: DERMATOLOGY | Age: 84
End: 2024-12-06

## 2024-12-06 DIAGNOSIS — D04.72: Primary | ICD-10-CM

## 2024-12-06 NOTE — PROGRESS NOTES
MG tablet Take 1 tablet by mouth daily             Physical Examination       Well appearing.    1.  Left medial lower knee with a 1 cm crusted erythematous patch.        Assessment and Plan     1. 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  The patient agreed to proceed and a consent form was signed.       The area to be treated with cleansed with alcohol and marked with surgical marking pen. Local anesthesia was acheived with 1% lidocaine with epinephrine. Sharp curettage was performed in 3 different directions. Hemostasis was obtained with aluminum chloride. The wound was dressed with petrolatum and a bandage.    There were no immediate complications and the patient left the office in good condition.            --Artie James MD

## 2025-01-30 ENCOUNTER — TELEPHONE (OUTPATIENT)
Age: 85
End: 2025-01-30

## 2025-01-30 RX ORDER — CLOBETASOL PROPIONATE 0.5 MG/G
CREAM TOPICAL
Qty: 120 G | Refills: 3 | Status: SHIPPED | OUTPATIENT
Start: 2025-01-30

## 2025-01-30 NOTE — TELEPHONE ENCOUNTER
Pt walked in the office and wants to know if you can call in rx for clobetasol Propionate cream to [facility] for 90 days with 3 refills.          Pt number [phone]